# Patient Record
Sex: MALE | Race: WHITE | Employment: OTHER | URBAN - METROPOLITAN AREA
[De-identification: names, ages, dates, MRNs, and addresses within clinical notes are randomized per-mention and may not be internally consistent; named-entity substitution may affect disease eponyms.]

---

## 2018-08-17 ENCOUNTER — OFFICE VISIT (OUTPATIENT)
Dept: NEUROLOGY | Age: 38
End: 2018-08-17

## 2018-08-17 VITALS
WEIGHT: 163 LBS | HEART RATE: 68 BPM | HEIGHT: 69 IN | OXYGEN SATURATION: 98 % | SYSTOLIC BLOOD PRESSURE: 130 MMHG | DIASTOLIC BLOOD PRESSURE: 80 MMHG | RESPIRATION RATE: 14 BRPM | BODY MASS INDEX: 24.14 KG/M2

## 2018-08-17 DIAGNOSIS — R41.3 MEMORY PROBLEM: Primary | ICD-10-CM

## 2018-08-17 DIAGNOSIS — Z86.79 HISTORY OF NONTRAUMATIC RUPTURE OF CEREBRAL ANEURYSM: ICD-10-CM

## 2018-08-17 RX ORDER — ASPIRIN 325 MG
325 TABLET ORAL DAILY
COMMUNITY

## 2018-08-17 NOTE — PROGRESS NOTES
S/P stroke in feb  Short term memory loss  Mood swings  Feet will go numb and tingly after about 5 minutes of sitting  Smelling things that are not

## 2018-08-17 NOTE — PROGRESS NOTES
Chief Complaint   Patient presents with    Neurologic Problem         HISTORY OF PRESENT ILLNESS  Owen Roque is a 34 y.o. 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 This occurred in February and he had coiling done in [facility].  He lives in [place] and comes here for work. 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 Since the aneurysm rupture, he has had issues with his memory. Overall, he has improved but still has difficulties remembering things, numbers and doing certain complicated tasks. He has not been able to return back to work. He is scheduled to get cognitive therapy but has not been able to get a sooner appointment in [place] and wishes to do all his further workup down here in [place]. Past Medical History:   Diagnosis Date    Cerebral artery occlusion with cerebral infarction [facility])      Current Outpatient Prescriptions   Medication Sig    aspirin (ASPIRIN) 325 mg tablet Take 325 mg by mouth daily. No current facility-administered medications for this visit. Allergies   Allergen Reactions    Poison Melissa Extract Rash     Family History   Problem Relation Age of Onset    Cancer Mother     Cancer Father      Social History   Substance Use Topics    Smoking status: Former Smoker     Years: 20.00     Quit date: 8/17/2017    Smokeless tobacco: Never Used    Alcohol use 4.2 oz/week     7 Glasses of wine per week     History reviewed. No pertinent surgical history.       REVIEW OF SYSTEMS  Review of Systems - History obtained from the patient  Psychological ROS: negative  ENT ROS: negative  Hematological and Lymphatic ROS: negative  Endocrine ROS: negative  Respiratory ROS: no cough, shortness of breath, or wheezing  Cardiovascular ROS: no chest pain or dyspnea on exertion  Gastrointestinal ROS: no abdominal pain, change in bowel habits, or black or bloody stools  Genito-Urinary ROS: no dysuria, trouble voiding, or hematuria  Musculoskeletal ROS: negative  Dermatological ROS: negative      PHYSICAL EXAMINATION:    Visit Vitals    /80    Pulse 68    Resp 14    Ht 5' 9\" (1.753 m)    Wt 73.9 kg (163 lb)    SpO2 98%    BMI 24.07 kg/m2     General:  Well defined, nourished, and groomed individual in no acute distress. Neck: Supple, nontender, thyroid within normal limits, no JVD, no bruits, no pain with resistance to active range of motion. Heart: Regular rate and rhythm, no murmurs, rub, or gallop. Normal S1S2. Lungs:  Clear to auscultation bilaterally with equal chest expansion, no cough, no wheeze  Musculoskeletal:  Extremities revealed no edema and had full range of motion of joints. Psych:  Good mood and bright affect    NEUROLOGICAL EXAMINATION:     Mental Status:   Alert and oriented to person, place, and time. He scored 23/30 on [place] cognitive assessment. He had mild difficulty in visuospatial functioning/clock drawing and could not get the numbers right. His language fluency was also impaired and he could only name a few letters that started with a B. Short-term recall was 2/5. Attention span and concentration are normal.       Cranial Nerves:    II, III, IV, VI:  Visual acuity grossly intact. Visual fields are normal.    Pupils are equal, round, and reactive to light and accommodation. Extra-ocular movements are full and fluid. Fundoscopic exam was benign, no ptosis or nystagmus. V-XII: Hearing is grossly intact. Facial features are symmetric, with normal sensation and strength. The palate rises symmetrically and the tongue protrudes midline. Sternocleidomastoids 5/5. Motor Examination: Normal tone, bulk, and strength. 5/5 muscle strength throughout. No cogwheel rigidity or clonus present. Sensory exam:  Normal throughout to pinprick, temperature, and vibration sense. Normal proprioception. Coordination:  Finger to nose and rapid arm movement testing was normal.   No resting or intention tremor    Gait and Station:  Steady while walking on toes, heels, and with tandem walking. Normal arm swing. No Rhomberg or pronator drift. No muscle wasting or fasiculations noted. Reflexes:  DTRs 2+ throughout. Toes downgoing. LABS / IMAGING  Old imaging and lab work was not available for review    ASSESSMENT    ICD-10-CM ICD-9-CM    1. Memory problem R41.3 780.93 CT HEAD WO CONT      VITAMIN B12      TSH 3RD GENERATION      REFERRAL TO NEUROPSYCHOLOGY      EEG      REFERRAL TO SPEECH THERAPY   2. History of nontraumatic rupture of cerebral aneurysm Z86.79 V12.59 CT HEAD WO CONT      REFERRAL TO NEUROPSYCHOLOGY      EEG      REFERRAL TO SPEECH THERAPY       DISCUSSION  Mr. Obed Auguste does have residual cognitive difficulties in the form of difficulties with visuospatial function, short-term recall and language fluency. He specifically has difficulty remembering numbers. I suspect that this is likely related to brain injury related to aneurysm rupture.   I have recommended CT brain to assess for areas of encephalomalacia/scarring  We will also check EEG, B12 and TSH  Referring to speech therapy for cognitive exercises  We will get comprehensive neuropsychological assessment to further delineate his cognitive issues and to evaluate for attention deficit, mood disorder etc.  He was encouraged to do memory exercises and different resources were discussed  Continue periodic follow-up    John Campos MD  Diplomate, American Board of Psychiatry & Neurology (Neurology)  [employer] of Psychiatry & Neurology (Clinical Neurophysiology)  Diplomate, American Board of Electrodiagnostic Medicine

## 2018-08-17 NOTE — MR AVS SNAPSHOT
[facility] [address] [place] [address] 
[phone] Patient: Marcia Bruce 
MRN: [id number] Visit Information Date & Time Provider Department Dept. Phone Encounter #  
 8/17/2018 10:30 AM Maria Isabel Majano MD [facility] at [address] [id number] Follow-up Instructions Return in about 2 months (around 10/17/2018). Allergies as of 8/17/2018  Review Complete On: 8/17/2018 By: Maria Isabel Majano MD  
  
 Severity Noted Reaction Type Reactions Poison Ivy Extract  08/17/2018    Rash Current Immunizations  Never Reviewed No immunizations on file. Not reviewed this visit You Were Diagnosed With   
  
 Codes Comments Memory problem    -  Primary ICD-10-CM: R41.3 ICD-9-CM: 780.93 History of nontraumatic rupture of cerebral aneurysm     ICD-10-CM: Z86.79 
ICD-9-CM: V12.59 Vitals BP Pulse Resp Height(growth percentile) Weight(growth percentile) SpO2  
 130/80 68 14 5' 9\" (1.753 m) 163 lb (73.9 kg) 98% BMI Smoking Status 24.07 kg/m2 Former Smoker Vitals History BMI and BSA Data Body Mass Index Body Surface Area 24.07 kg/m 2 1.9 m 2 Your Updated Medication List  
  
   
This list is accurate as of 8/17/18 11:50 AM.  Always use your most recent med list.  
  
  
  
  
 aspirin 325 mg tablet Commonly known as:  ASPIRIN Take 325 mg by mouth daily. We Performed the Following REFERRAL TO NEUROPSYCHOLOGY [DDG17 Custom] Comments:  
 Memory problems after ICH REFERRAL TO SPEECH THERAPY [VKJ643 Custom] Comments:  
 Cognitive therapy-memory issues after ICH  
 TSH 3RD GENERATION [id number] CPT(R)] VITAMIN B12 [id number] CPT(R)] Follow-up Instructions Return in about 2 months (around 10/17/2018). To-Do List   
 08/20/2018 Imaging:  CT HEAD WO CONT   
  
 08/20/2018 Neurology:  EEG Referral Information Referral ID Referred By Referred To  
  
 [id number] [facility] [address] [place], [address] Visits Status Start Date End Date 1 New Request 8/17/18 8/17/19 If your referral has a status of pending review or denied, additional information will be sent to support the outcome of this decision. Referral ID Referred By Referred To  
 [id number] Sandy Akers S Not Available Visits Status Start Date End Date 1 New Request 8/17/18 8/17/19 If your referral has a status of pending review or denied, additional information will be sent to support the outcome of this decision. Introducing [address]! Shy Bautista introduces [employer] patient portal. Now you can access parts of your medical record, email your doctor's office, and request medication refills online. 1. In your internet browser, go to [email or web address] 2. Click on the First Time User? Click Here link in the Sign In box. You will see the New Member Sign Up page. 3. Enter your [employer] Access Code exactly as it appears below. You will not need to use this code after youve completed the sign-up process. If you do not sign up before the expiration date, you must request a new code. · [employer] Access Code: [email or web address] Expires: 11/15/2018 10:49 AM 
 
4. Enter the last four digits of your Social Security Number (xxxx) and Date of Birth (mm/dd/yyyy) as indicated and click Submit. You will be taken to the next sign-up page. 5. Create a [employer] ID. This will be your [employer] login ID and cannot be changed, so think of one that is secure and easy to remember. 6. Create a [employer] password. You can change your password at any time. 7. Enter your Password Reset Question and Answer. This can be used at a later time if you forget your password. 8. Enter your e-mail address. You will receive e-mail notification when new information is available in [address] 9. Click Sign Up. You can now view and download portions of your medical record. 10. Click the Download Summary menu link to download a portable copy of your medical information. If you have questions, please visit the Frequently Asked Questions section of the [employer] website. Remember, [employer] is NOT to be used for urgent needs. For medical emergencies, dial 911. Now available from your iPhone and Android! Please provide this summary of care documentation to your next provider. If you have any questions after today's visit, please call [phone].

## 2018-08-18 LAB
TSH SERPL DL<=0.005 MIU/L-ACNC: 0.24 UIU/ML (ref 0.45–4.5)
VIT B12 SERPL-MCNC: 448 PG/ML (ref 232–1245)

## 2018-08-20 DIAGNOSIS — R79.89 LOW TSH LEVEL: Primary | ICD-10-CM

## 2018-08-20 NOTE — PROGRESS NOTES
Please inform that his TSH was mildly low. I will order further tests i.e. T4 and T3. He will need to discuss this with a PCP. Jovan Degroot

## 2018-08-22 ENCOUNTER — TELEPHONE (OUTPATIENT)
Dept: NEUROLOGY | Age: 38
End: 2018-08-22

## 2018-08-22 NOTE — TELEPHONE ENCOUNTER
----- Message from Magdy Galindo MD sent at 8/20/2018  8:27 AM EDT -----  Please inform that his TSH was mildly low. I will order further tests i.e. T4 and T3. He will need to discuss this with a PCP. Kaity Marr

## 2018-08-28 ENCOUNTER — HOSPITAL ENCOUNTER (OUTPATIENT)
Dept: CT IMAGING | Age: 38
Discharge: HOME | End: 2018-08-28
Attending: PSYCHIATRY & NEUROLOGY
Payer: COMMERCIAL

## 2018-08-28 ENCOUNTER — HOSPITAL ENCOUNTER (OUTPATIENT)
Dept: NEUROLOGY | Age: 38
Discharge: HOME | End: 2018-08-28
Attending: PSYCHIATRY & NEUROLOGY
Payer: COMMERCIAL

## 2018-08-28 DIAGNOSIS — Z86.79 HISTORY OF NONTRAUMATIC RUPTURE OF CEREBRAL ANEURYSM: ICD-10-CM

## 2018-08-28 DIAGNOSIS — R41.3 MEMORY PROBLEM: ICD-10-CM

## 2018-08-28 PROCEDURE — 95816 EEG AWAKE AND DROWSY: CPT

## 2018-08-28 PROCEDURE — 70450 CT HEAD/BRAIN W/O DYE: CPT

## 2018-08-29 NOTE — PROCEDURES
Brian Og, [address]      Electroencephalogram         Procedure Date: 08/28/2018    Procedure ID: MR     Procedure Type: Routine    Patient Name: Carrie Pinedo     YOB: 1980    Medical Record No: [id number]    INDICATION: memory issues    Medications:    Current Outpatient Prescriptions:     aspirin (ASPIRIN) 325 mg tablet, Take 325 mg by mouth daily. , Disp: , Rfl:     DESCRIPTION OF PROCEDURE: Electrodes were applied in accordance with the international 10-20 system of electrode placement. EEG was reviewed in both bipolar and referential montages    Description of Activity:  During wakefulness, there is continuous runs of 10 Hz symmetric posterior alpha rhythm, that attenuate symmetrically with eye opening. Low voltage beta activity occurs symmetrically at the anterior head regions bilaterally. During drowsiness, there is attenuation of the alpha rhythm and low voltage theta activity occurs bilaterally. Sleep was not recorded. Intermittent photic stimulation was performed and did not induce posterior driving responses. No sharp or spike discharges, seizures or epileptiform discharges seen. No focal asymmetry. Clinical Interpretation: This EEG, performed during wakefulness and drowsiness is normal. There is no focal asymmetry, seizures or epileptiform discharges seen.

## 2018-09-05 ENCOUNTER — TELEPHONE (OUTPATIENT)
Dept: NEUROLOGY | Age: 38
End: 2018-09-05

## 2018-09-05 NOTE — TELEPHONE ENCOUNTER
----- Message from Kaylah Campos MD sent at 8/29/2018  1:54 PM EDT -----  CT brain did not show anything new and EEG was normal.  Please inform.

## 2018-09-28 ENCOUNTER — TELEPHONE (OUTPATIENT)
Dept: NEUROLOGY | Age: 38
End: 2018-09-28

## 2018-09-28 NOTE — TELEPHONE ENCOUNTER
Please inform that his CT brain and EEG was normal.  I did order some more blood work for his thyroid function but that can be followed up with a PCP. I do not have any pending disability paperwork and I must have already completed them, if they did come to me.

## 2018-09-28 NOTE — TELEPHONE ENCOUNTER
Pt states that had testing done over 2 weeks ago that was ordered by Dr. Leigh Robles. He would like to know the outcome of the tests but he would also like to know if Dr. Leigh Robles finished his disability paperwork. Please call back.

## 2018-09-28 NOTE — TELEPHONE ENCOUNTER
Spoke with patient. Informed him of CT brain and EEG results, per Dr. Gabriela Tuttle. Found his disability form scanned in under media. Patient would like form faxed to his HR department at [phone] attn: Joce Manrique. He asked for lab orders to be mailed to his address on file. Patient was given an opportunity to ask questions, repeated information, and verbalized understanding.

## 2018-10-10 ENCOUNTER — TELEPHONE (OUTPATIENT)
Dept: NEUROLOGY | Age: 38
End: 2018-10-10

## 2018-10-10 NOTE — TELEPHONE ENCOUNTER
Augustine calling about disability paperwork needing to be longer than Nov member 5th, but paperwork is has been sent already and needs to be done over again. Please call back.    Just in case   Fax number: [phone]

## 2018-10-11 NOTE — TELEPHONE ENCOUNTER
LM sterling Bradshaw that Dr Cony Luque was out of the office until 10/15 but I will forward to  to be addressed then.

## 2018-10-14 LAB
T3 SERPL-MCNC: 105 NG/DL (ref 71–180)
T4 SERPL-MCNC: 7.2 UG/DL (ref 4.5–12)

## 2018-10-26 ENCOUNTER — TELEPHONE (OUTPATIENT)
Dept: NEUROLOGY | Age: 38
End: 2018-10-26

## 2018-10-26 NOTE — TELEPHONE ENCOUNTER
----- Message from Harry Jenkins sent at 10/26/2018 11:57 AM EDT -----  Regarding: Dr. Pratibha Campbell  Pt would like to know if his long term disability paperwork has been returned to his HR Dept without a return to work date requested after 10/08/18. Best contact number [phone].

## 2018-10-29 NOTE — TELEPHONE ENCOUNTER
Spoke with patient who no showed last appointment to fill out those papers.  Rescheduled patient for 11/7 @ 8am

## 2018-11-07 ENCOUNTER — OFFICE VISIT (OUTPATIENT)
Dept: NEUROLOGY | Age: 38
End: 2018-11-07

## 2018-11-07 VITALS
HEART RATE: 74 BPM | BODY MASS INDEX: 24.73 KG/M2 | OXYGEN SATURATION: 98 % | DIASTOLIC BLOOD PRESSURE: 80 MMHG | RESPIRATION RATE: 14 BRPM | SYSTOLIC BLOOD PRESSURE: 122 MMHG | HEIGHT: 69 IN | WEIGHT: 167 LBS

## 2018-11-07 DIAGNOSIS — R40.4 TRANSIENT ALTERATION OF AWARENESS: ICD-10-CM

## 2018-11-07 DIAGNOSIS — R41.3 MEMORY PROBLEM: ICD-10-CM

## 2018-11-07 DIAGNOSIS — Z86.79 HISTORY OF NONTRAUMATIC RUPTURE OF CEREBRAL ANEURYSM: Primary | ICD-10-CM

## 2018-11-07 RX ORDER — LEVETIRACETAM 500 MG/1
500 TABLET ORAL 2 TIMES DAILY
Qty: 60 TAB | Refills: 1 | Status: SHIPPED | OUTPATIENT
Start: 2018-11-07 | End: 2020-03-02 | Stop reason: SDUPTHER

## 2018-11-07 NOTE — PROGRESS NOTES
Chief Complaint Patient presents with  Neurologic Problem HISTORY OF PRESENT ILLNESS Jimy Chairez came back for follow-up. He reports that he has been having transient episodes, lasting 10-20 seconds, where he feels somewhat altered and cannot process anything. Then it passes and he is back to doing what he is doing. He will tend to stare at something during that time. His memory issues are no different. He tells me that he had comprehensive neuropsychological assessment done the results of which are not available for review today. 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 This occurred in February and he had coiling done in [facility].  He lives in [place] and comes here for work. 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 Since the aneurysm rupture, he has had issues with his memory. Overall, he has improved but still has difficulties remembering things, numbers and doing certain complicated tasks. He has not been able to return back to work. He is scheduled to get cognitive therapy but has not been able to get a sooner appointment in [place] and wishes to do all his further workup down here in [place]. Current Outpatient Medications Medication Sig  levETIRAcetam (KEPPRA) 500 mg tablet Take 1 Tab by mouth two (2) times a day.  aspirin (ASPIRIN) 325 mg tablet Take 325 mg by mouth daily. No current facility-administered medications for this visit. PHYSICAL EXAMINATION:   
Visit Vitals /80 Pulse 74 Resp 14 Ht 5' 9\" (1.753 m) Wt 75.8 kg (167 lb) SpO2 98% BMI 24.66 kg/m² NEUROLOGICAL EXAMINATION:    
Mental Status:   Alert and oriented to person, place, and time.   He scored 23/30 on recent [facility] cognitive assessment. He had mild difficulty in visuospatial functioning/clock drawing and could not get the numbers right. His language fluency was also impaired and he could only name a few letters that started with a B. Short-term recall was 2/5. Attention span and concentration are normal.    
 
Cranial Nerves:   
II, III, IV, VI:  Visual acuity grossly intact. Visual fields are normal.   
Pupils are equal, round, and reactive to light and accommodation. Extra-ocular movements are full and fluid. V-XII: Hearing is grossly intact. Facial features are symmetric, with normal sensation and strength. The palate rises symmetrically and the tongue protrudes midline. Motor Examination: Normal tone, bulk, and strength. 5/5 muscle strength throughout. No cogwheel rigidity or clonus present. Sensory exam:  Normal throughout to pinprick, temperature, and vibration sense. Normal proprioception. Coordination:  No resting or intention tremor Gait and Station:  Steady. Normal arm swing. No Rhomberg or pronator drift. No muscle wasting or fasiculations noted. Reflexes:  DTRs 2+ throughout. Toes downgoing. LABS / IMAGING 
CT Results (most recent): 
Results from Hospital Encounter encounter on 08/28/18 CT HEAD WO CONT Narrative EXAM:  CT HEAD WO CONT INDICATION:   ICH follow up COMPARISON: None. TECHNIQUE: Unenhanced CT of the head was performed using 5 mm images. Brain and 
bone windows were generated. CT dose reduction was achieved through use of a 
standardized protocol tailored for this examination and automatic exposure 
control for dose modulation. FINDINGS: 
Postprocedural changes of anterior communicating artery aneurysm coiling, with 
streak artifact limiting evaluation this region. Small chronic infarct in the 
right frontal deep white matter. The ventricles are normal in size and position. Basilar cisterns are patent. No midline shift. There is no evidence of acute 
infarct, hemorrhage, or extraaxial fluid collection. The paranasal sinuses, mastoid air cells, and middle ears are clear. The orbital 
contents are within normal limits. There are no significant osseous or 
extracranial soft tissue lesions. Impression IMPRESSION: 
1. No evidence of acute intracranial abnormality. Small chronic infarct in the 
right frontal deep white matter. 2. Postprocedural changes of anterior communicating artery aneurysm coil 
embolization. EEG was normal 
 
B12 and TFTs were unremarkable ASSESSMENT 
  ICD-10-CM ICD-9-CM 1. History of nontraumatic rupture of cerebral aneurysm Z86.79 V12.59 CBC W/O DIFF  
   METABOLIC PANEL, COMPREHENSIVE 2. Memory problem R41.3 780.93 3. Transient alteration of awareness R40.4 780.02 levETIRAcetam (KEPPRA) 500 mg tablet CBC W/O DIFF  
   METABOLIC PANEL, COMPREHENSIVE  
 
 
DISCUSSION Mr. Christ Hyatt does have residual cognitive difficulties in the form of difficulties with visuospatial function, short-term recall and language fluency. He specifically has difficulty remembering numbers. This is likelyrelated to brain injury related to aneurysm rupture. He has also been having transient lapses in awareness which could be focal onset/frontal lobe seizures We will empirically try him on Keppra 500 mg twice a day If it does not help, we may need to consider inpatient video EEG monitoring Side effect profile of Keppra was reviewed and will repeat lab work in 2-3 weeks. He reports severe thrombocytopenia when he was put on Depakote once for mood disorder Continue memory exercises and different resources were discussed. Obtain results of his complaints of neuropsychological assessment Follow-up in 3 months Mariah Dukes MD 
Diplomate, American Board of Psychiatry & Neurology (Neurology) Irene Palom[place] Board of Psychiatry & Neurology (Clinical Neurophysiology) Diplomate, [facility] Board of Electrodiagnostic Medicine This note will not be viewable in [employer].

## 2018-12-18 ENCOUNTER — TELEPHONE (OUTPATIENT)
Dept: NEUROLOGY | Age: 38
End: 2018-12-18

## 2018-12-18 NOTE — TELEPHONE ENCOUNTER
Called Patient. Patient stated his work found the paperwork. No need for call back. I verbalized understanding.

## 2018-12-19 ENCOUNTER — TELEPHONE (OUTPATIENT)
Dept: NEUROLOGY | Age: 38
End: 2018-12-19

## 2018-12-19 NOTE — TELEPHONE ENCOUNTER
----- Message from Ana Davis sent at 12/19/2018  9:56 AM EST -----  Regarding: Dr Sarah Harrison from [place] [phone], following up on pt disability claim calling to get some information on why pt  Is out of work.,this is after a fax was sent out yesterday 12/18/18

## 2019-01-18 ENCOUNTER — TELEPHONE (OUTPATIENT)
Dept: NEUROLOGY | Age: 39
End: 2019-01-18

## 2019-01-18 NOTE — TELEPHONE ENCOUNTER
Spoke with patient about paperwork. We have paperwork and patient requested it filled before appointment on 1/23./ I verbalized understanding.

## 2019-01-18 NOTE — TELEPHONE ENCOUNTER
----- Message from Amanda Miller sent at 1/18/2019 12:39 PM EST -----  Regarding: Dr. Olga Haque  Pt is calling to speak with nurse to verify if  a fax was received regarding his condition on [place] [phone].

## 2019-01-23 ENCOUNTER — OFFICE VISIT (OUTPATIENT)
Dept: NEUROLOGY | Age: 39
End: 2019-01-23

## 2019-01-23 VITALS
OXYGEN SATURATION: 98 % | HEART RATE: 76 BPM | SYSTOLIC BLOOD PRESSURE: 130 MMHG | RESPIRATION RATE: 18 BRPM | DIASTOLIC BLOOD PRESSURE: 80 MMHG | HEIGHT: 69 IN | WEIGHT: 169 LBS | BODY MASS INDEX: 25.03 KG/M2

## 2019-01-23 DIAGNOSIS — F41.9 ANXIETY: ICD-10-CM

## 2019-01-23 DIAGNOSIS — Z86.79 HISTORY OF NONTRAUMATIC RUPTURE OF CEREBRAL ANEURYSM: Primary | ICD-10-CM

## 2019-01-23 DIAGNOSIS — R41.89 COGNITIVE IMPAIRMENT: ICD-10-CM

## 2019-01-23 RX ORDER — PAROXETINE 10 MG/1
10 TABLET, FILM COATED ORAL DAILY
Qty: 30 TAB | Refills: 1 | Status: SHIPPED | OUTPATIENT
Start: 2019-01-23

## 2019-01-23 NOTE — PROGRESS NOTES
Chief Complaint Patient presents with  Neurologic Problem HISTORY OF PRESENT ILLNESS Janett Monroe came back for follow-up. He he states that he has not had episodes where he cannot process anything for several seconds. He is taking Keppra 500 mg twice a day. His memory issues are unchanged. He had comprehensive neuropsychological testing done which showed deficits in several cognitive domains and it also suggested underlying anxiety and depression. 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 This occurred in February and he had coiling done in [facility].  He lives in [facility] and comes here for work. 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 Since the aneurysm rupture, he has had issues with his memory. Overall, he has improved but still has difficulties remembering things, numbers and doing certain complicated tasks. He has not been able to return back to work. He is scheduled to get cognitive therapy but has not been able to get a sooner appointment in [facility] and wishes to do all his further workup down here in [facility]. Current Outpatient Medications Medication Sig  PARoxetine (PAXIL) 10 mg tablet Take 1 Tab by mouth daily.  aspirin (ASPIRIN) 325 mg tablet Take 325 mg by mouth daily.  levETIRAcetam (KEPPRA) 500 mg tablet Take 1 Tab by mouth two (2) times a day. No current facility-administered medications for this visit. PHYSICAL EXAMINATION:   
Visit Vitals /80 Pulse 76 Resp 18 Ht 5' 9\" (1.753 m) Wt 76.7 kg (169 lb) SpO2 98% BMI 24.96 kg/m² NEUROLOGICAL EXAMINATION:    
Mental Status:   Alert and oriented to person, place, and time.   He scored 23/30 on last [facility] cognitive assessment. He had mild difficulty in visuospatial functioning/clock drawing and could not get the numbers right. His language fluency was also impaired and he could only name a few letters that started with a B. Short-term recall was 2/5. Attention span and concentration are normal.    
 
Cranial Nerves:   
II, III, IV, VI:  Visual acuity grossly intact. Visual fields are normal.   
Pupils are equal, round, and reactive to light and accommodation. Extra-ocular movements are full and fluid. V-XII: Hearing is grossly intact. Facial features are symmetric, with normal sensation and strength. The palate rises symmetrically and the tongue protrudes midline. Motor Examination: Normal tone, bulk, and strength. 5/5 muscle strength throughout. No cogwheel rigidity or clonus present. Sensory exam:  Normal throughout to pinprick, temperature, and vibration sense. Normal proprioception. Coordination:  No resting or intention tremor Gait and Station:  Steady. Normal arm swing. No Rhomberg or pronator drift. No muscle wasting or fasiculations noted. Reflexes:  DTRs 2+ throughout. Toes downgoing. LABS / IMAGING 
CT Results (most recent): 
Results from Hospital Encounter encounter on 08/28/18 CT HEAD WO CONT Narrative EXAM:  CT HEAD WO CONT INDICATION:   ICH follow up COMPARISON: None. TECHNIQUE: Unenhanced CT of the head was performed using 5 mm images. Brain and 
bone windows were generated. CT dose reduction was achieved through use of a 
standardized protocol tailored for this examination and automatic exposure 
control for dose modulation. FINDINGS: 
Postprocedural changes of anterior communicating artery aneurysm coiling, with 
streak artifact limiting evaluation this region. Small chronic infarct in the 
right frontal deep white matter. The ventricles are normal in size and position. Basilar cisterns are patent. No midline shift. There is no evidence of acute 
infarct, hemorrhage, or extraaxial fluid collection. The paranasal sinuses, mastoid air cells, and middle ears are clear. The orbital 
contents are within normal limits. There are no significant osseous or 
extracranial soft tissue lesions. Impression IMPRESSION: 
1. No evidence of acute intracranial abnormality. Small chronic infarct in the 
right frontal deep white matter. 2. Postprocedural changes of anterior communicating artery aneurysm coil 
embolization. EEG was normal 
 
B12 and TFTs were unremarkable ASSESSMENT 
  ICD-10-CM ICD-9-CM 1. History of nontraumatic rupture of cerebral aneurysm Z86.79 V12.59 2. Cognitive impairment R41.89 294.9 REFERRAL TO OCCUPATIONAL THERAPY 3. Anxiety F41.9 300.00 PARoxetine (PAXIL) 10 mg tablet DISCUSSION Mr. Rosmery Wray does have residual cognitive difficulties in the form of difficulties with visuospatial function, short-term recall and language fluency. He specifically has difficulty remembering numbers. This is likelyrelated to brain injury related to aneurysm rupture. He has had transient lapses in awareness which could be focal onset/frontal lobe seizures. He is doing better with Keppra. We will monitor him clinically and if the episodes return, we may need to consider inpatient video EEG monitoring Referral was provided for occupational therapy/behavioral therapy Trial of paroxetine to help with underlying anxiety and depression Follow-up in 3 months Baljinder Kuhn MD 
Diplomate, American Board of Psychiatry & Neurology (Neurology) Lesli Hernandez Board of Psychiatry & Neurology (Clinical Neurophysiology) Diplomate, [address] [place] [address] Board of Electrodiagnostic Medicine This note will not be viewable in [address]

## 2019-01-23 NOTE — PATIENT INSTRUCTIONS
PRESCRIPTION REFILL POLICY [facility] Statement to Patients April 1, 2014 In an effort to ensure the large volume of patient prescription refills is processed in the most efficient and expeditious manner, we are asking our patients to assist us by calling your Pharmacy for all prescription refills, this will include also your  Mail Order Pharmacy. The pharmacy will contact our office electronically to continue the refill process. Please do not wait until the last minute to call your pharmacy. We need at least 48 hours (2days) to fill prescriptions. We also encourage you to call your pharmacy before going to  your prescription to make sure it is ready. With regard to controlled substance prescription refill requests (narcotic refills) that need to be picked up at our office, we ask your cooperation by providing us with at least 72 hours (3days) notice that you will need a refill. We will not refill narcotic prescription refill requests after 4:00pm on any weekday, Monday through Thursday, or after 2:00pm on Fridays, or on the weekends. We encourage everyone to explore another way of getting your prescription refill request processed using [employer], our patient web portal through our electronic medical record system. [employer] is an efficient and effective way to communicate your medication request directly to the office and  downloadable as an rudi on your smart phone . [employer] also features a review functionality that allows you to view your medication list as well as leave messages for your physician. Are you ready to get connected? If so please review the attatched instructions or speak to any of our staff to get you set up right away! Thank you so much for your cooperation. Should you have any questions please contact our Practice Administrator. The Physicians and Staff,  [facility]

## 2019-03-21 ENCOUNTER — TELEPHONE (OUTPATIENT)
Dept: NEUROLOGY | Age: 39
End: 2019-03-21

## 2019-03-21 NOTE — TELEPHONE ENCOUNTER
----- Message from smsPREP sent at 3/21/2019  1:50 PM EDT -----  Regarding: Dr. Caitlyn Guevara  Pt request a call back from the practice in regards a call back for a status update on his occupational therapy referral. He stated that he was that he would recieve a call about it when he was last seen on 1/23 but he has not heard anything as of yet. Best contact number is [phone].

## 2019-04-11 ENCOUNTER — TELEPHONE (OUTPATIENT)
Dept: NEUROLOGY | Age: 39
End: 2019-04-11

## 2019-04-11 NOTE — TELEPHONE ENCOUNTER
Spoke with patient, verified name and . Provided him with the contact number for [facility]. Patient was given an opportunity to ask questions, repeated information, and verbalized understanding.

## 2019-04-11 NOTE — TELEPHONE ENCOUNTER
Patient calling needing a referral to [facility] again for Occupational Therapy. Patient stated [facility] never called him to set up an appointment.   Please call back

## 2019-04-11 NOTE — TELEPHONE ENCOUNTER
----- Message from Ad Bender sent at 4/11/2019 11:36 AM EDT -----  Regarding: Dr. Eli Alicea  Pt is returning a call back from Dr. Tanika Alexander in reference to referral & appt for OT.      Best contact  [phone]; anytime before 6pm.

## 2019-04-18 DIAGNOSIS — R41.89 COGNITIVE IMPAIRMENT: Primary | ICD-10-CM

## 2019-06-03 ENCOUNTER — TELEPHONE (OUTPATIENT)
Dept: NEUROLOGY | Age: 39
End: 2019-06-03

## 2019-07-09 ENCOUNTER — TELEPHONE (OUTPATIENT)
Dept: NEUROLOGY | Age: 39
End: 2019-07-09

## 2019-07-09 NOTE — TELEPHONE ENCOUNTER
----- Message from Zbigniew Parry 2906 sent at 7/9/2019  1:38 PM EDT -----  Regarding: Dr John Viera telephone  [facility], from insurance credentialing, is requesting a callback regarding a fax sent over regarding the pt's psych testing that wasn't completed and to see if the pt would be referred again.        Best contact number is [phone] ext [phone]; [phone]

## 2019-07-18 ENCOUNTER — TELEPHONE (OUTPATIENT)
Dept: NEUROLOGY | Age: 39
End: 2019-07-18

## 2019-07-18 DIAGNOSIS — Z86.79 HISTORY OF NONTRAUMATIC RUPTURE OF CEREBRAL ANEURYSM: Primary | ICD-10-CM

## 2019-07-18 DIAGNOSIS — R41.89 COGNITIVE IMPAIRMENT: Primary | ICD-10-CM

## 2019-07-18 DIAGNOSIS — R41.89 COGNITIVE IMPAIRMENT: ICD-10-CM

## 2019-07-18 DIAGNOSIS — Z86.79 HISTORY OF NONTRAUMATIC RUPTURE OF CEREBRAL ANEURYSM: ICD-10-CM

## 2019-07-18 DIAGNOSIS — R41.3 MEMORY PROBLEM: ICD-10-CM

## 2019-07-18 NOTE — TELEPHONE ENCOUNTER
----- Message from [facility] sent at 7/18/2019 12:56 PM EDT -----  Regarding: Dr Chay Dick telephone      The pt is requesting a callback because he was referred to [facility] for speech therapy, but when he got there the order was actually for occupational therapy.       Best contact number is [phone]

## 2019-07-18 NOTE — TELEPHONE ENCOUNTER
Spoke with patient, he states he saw [facility] and due to his memory loss they are requesting a referral for speech therapy.

## 2019-07-22 ENCOUNTER — TELEPHONE (OUTPATIENT)
Dept: NEUROLOGY | Age: 39
End: 2019-07-22

## 2019-07-22 NOTE — TELEPHONE ENCOUNTER
I have received a referral for neuropsych testing. With referral I was also given a [employer] auth release form regarding [employer] case. I reviewed the pt's chart and I see [employer] called on 07/09/19 about neuropsych testing that was  incompleted. I reached out to [employer] bc there was a neuropsych eval done in September of 2018. [facility] Mike Navarro told me they are not requesting that the pt be retested but they wanted to know if a new referral would be sent out to complete the testing. I tried to ask what part of the test was not completed and she was not sure so she is getting with a nurse to make sure. At this point I am wanting to know if pt does actually need to be retested by Dr. Michael Patricia? My best contact is [phone].

## 2019-07-23 NOTE — TELEPHONE ENCOUNTER
Reviewed note and based on provider response called [employer] agent Sandy to try to get the information that is needed and the type of tests the pt needs to take. Left her a v/m. [phone] ext [phone].

## 2019-07-25 ENCOUNTER — TELEPHONE (OUTPATIENT)
Dept: NEUROLOGY | Age: 39
End: 2019-07-25

## 2019-07-25 NOTE — TELEPHONE ENCOUNTER
Returned your call. She is leaving and won't be back until Tuesday but you can call the main number if you need to.

## 2019-08-07 NOTE — TELEPHONE ENCOUNTER
Waiting for pt to get apt scheduled and then will reach out to [employer] to see what forms are needed for testing.

## 2019-09-05 ENCOUNTER — OFFICE VISIT (OUTPATIENT)
Dept: NEUROLOGY | Age: 39
End: 2019-09-05

## 2019-09-05 VITALS
HEART RATE: 88 BPM | HEIGHT: 69 IN | WEIGHT: 165 LBS | RESPIRATION RATE: 16 BRPM | OXYGEN SATURATION: 97 % | BODY MASS INDEX: 24.44 KG/M2 | SYSTOLIC BLOOD PRESSURE: 138 MMHG | DIASTOLIC BLOOD PRESSURE: 90 MMHG

## 2019-09-05 DIAGNOSIS — F41.9 ANXIETY: ICD-10-CM

## 2019-09-05 DIAGNOSIS — Z86.79 HISTORY OF NONTRAUMATIC RUPTURE OF CEREBRAL ANEURYSM: Primary | ICD-10-CM

## 2019-09-05 DIAGNOSIS — R40.4 TRANSIENT ALTERATION OF AWARENESS: ICD-10-CM

## 2019-09-05 DIAGNOSIS — R41.89 COGNITIVE IMPAIRMENT: ICD-10-CM

## 2019-09-05 RX ORDER — LAMOTRIGINE 25 MG/1
TABLET ORAL
Qty: 42 TAB | Refills: 0 | Status: SHIPPED | OUTPATIENT
Start: 2019-09-05 | End: 2019-10-03 | Stop reason: SDUPTHER

## 2019-09-05 NOTE — PATIENT INSTRUCTIONS
[facility] [place] [facility]   Statement to Patients  April 1, 2014      In an effort to ensure the large volume of patient prescription refills is processed in the most efficient and expeditious manner, we are asking our patients to assist us by calling your Pharmacy for all prescription refills, this will include also your  Mail Order Pharmacy. The pharmacy will contact our office electronically to continue the refill process. Please do not wait until the last minute to call your pharmacy. We need at least 48 hours (2days) to fill prescriptions. We also encourage you to call your pharmacy before going to  your prescription to make sure it is ready. With regard to controlled substance prescription refill requests (narcotic refills) that need to be picked up at our office, we ask your cooperation by providing us with at least 72 hours (3days) notice that you will need a refill. We will not refill narcotic prescription refill requests after 4:00pm on any weekday, Monday through Thursday, or after 2:00pm on Fridays, or on the weekends. We encourage everyone to explore another way of getting your prescription refill request processed using [employer], our patient web portal through our electronic medical record system. [employer] is an efficient and effective way to communicate your medication request directly to the office and  downloadable as an rudi on your smart phone . [employer] also features a review functionality that allows you to view your medication list as well as leave messages for your physician. Are you ready to get connected? If so please review the attatched instructions or speak to any of our staff to get you set up right away! Thank you so much for your cooperation. Should you have any questions please contact our Practice Administrator.     The Physicians and Staff,  [facility] [place] [facility]

## 2019-09-05 NOTE — PROGRESS NOTES
Mr. Kunz Jadiel presents today to follow up aneurysm and cognitive impairment. Depression screening done on this patient.

## 2019-09-19 ENCOUNTER — TELEPHONE (OUTPATIENT)
Dept: NEUROLOGY | Age: 39
End: 2019-09-19

## 2019-09-19 NOTE — TELEPHONE ENCOUNTER
Dr. Mc Angel' office calling to know details on referral -- Charmaine Malin, who works with Dr. Caio Botello, has some questions. Please advise.         Best # [phone] (ok to leave detailed voicemail; confidential line per Charmaine Malin)

## 2019-10-07 ENCOUNTER — OFFICE VISIT (OUTPATIENT)
Dept: NEUROLOGY | Age: 39
End: 2019-10-07

## 2019-10-07 DIAGNOSIS — F03.90 MAJOR NEUROCOGNITIVE DISORDER (HCC): Primary | ICD-10-CM

## 2019-10-07 DIAGNOSIS — F32.A ANXIETY AND DEPRESSION: ICD-10-CM

## 2019-10-07 DIAGNOSIS — F41.9 ANXIETY AND DEPRESSION: ICD-10-CM

## 2019-10-07 NOTE — PROGRESS NOTES
This note will not be viewable in [address] [facility] at [address]    Office:  [phone]  Fax: [phone]                 Initial Office Exam    Patient Name: Libby Dominique  Age: 37 y.o. Gender: male   Occupation: Unemployed  for [employer]  Handedness: right handed   Presenting Concern: had no chief complaint listed for this encounter. Primary Care Physician: Unknown, Provider  Referring Provider: No ref. provider found      REASON FOR REFERRAL:  This comprehensive and medically necessary neuropsychological assessment was requested to assist a differential diagnosis of vascular dementia following a ruptured anterior communicating artery in February 2018. .  The use and purpose of this examination, as well as the extent and limitations of confidentiality, were explained prior to obtaining permission to participate. Instructions were provided regarding the necessity to put forth optimal effort and answer questions truthfully in order to obtain reliable and accurate test results. PERTINENT HISTORY:  Mr. Renae Casiano presented for a neuropsychological assessment at the recommendation of he treating physician secondary to complaints of severe memory impairment, decreased attention and concentration, decreased fluency, impaired processing speed, and change in personality (melinda indifference). 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 From a brief review of his medical and personal history there has not been any other significant neurological injury or illness noted or reported.   He did not report experiencing depression or anxiety in the past.      Mr. Renae Casiano does not  report any problems at birth or difficulties meeting developmental milestones. He reports that he had an adequate level of family support and was not subject to trauma or abuse as a child. Mr. Juana Langley does not  report being retain in school or receiving special assistance in any of he classes or subjects. Mr. Juana Langley completed 12 years of education. Mr. Juana Langley does  exercise on a regular basis and does  maintain a balanced diet. He does  report problems with sleep and does not  complain of pain. He does not  participate in mentally stimulating activities. Mr. Juana Langley does not  have concerns regarding prescription medications, family members, place of residence, or financial stressors. Mr. Juana Langley indicated that he is independent in his instrumental activities of daily living, including shopping, meal preparation, housekeeping, doing laundry, driving a car, managing medications, and finances. Current Outpatient Medications   Medication Sig    lamoTRIgine (LAMICTAL) 100 mg tablet 1 daily for 1 week, then 1 twice a day    PARoxetine (PAXIL) 10 mg tablet Take 1 Tab by mouth daily.  levETIRAcetam (KEPPRA) 500 mg tablet Take 1 Tab by mouth two (2) times a day.  aspirin (ASPIRIN) 325 mg tablet Take 325 mg by mouth daily. No current facility-administered medications for this visit. Past Medical History:   Diagnosis Date    Cerebral artery occlusion with cerebral infarction ([facility] [place] [address]        No flowsheet data found. No data recorded    No past surgical history on file. Social History     Socioeconomic History    Marital status:      Spouse name: Not on file    Number of children: Not on file    Years of education: Not on file    Highest education level: Not on file   Tobacco Use    Smoking status: Former Smoker     Years: 20.00     Last attempt to quit: 2017     Years since quittin.1    Smokeless tobacco: Never Used   Substance and Sexual Activity    Alcohol use:  Yes     Alcohol/week: 7.0 standard drinks     Types: 7 Glasses of wine per week    Drug use: No    Sexual activity: Yes       Family History   Problem Relation Age of Onset    Cancer Mother     Cancer Father        CT Results (most recent):  Results from Hospital Encounter encounter on 08/28/18   CT HEAD WO CONT    Narrative EXAM:  CT HEAD WO CONT    INDICATION:   ICH follow up    COMPARISON: None. TECHNIQUE: Unenhanced CT of the head was performed using 5 mm images. Brain and  bone windows were generated. CT dose reduction was achieved through use of a  standardized protocol tailored for this examination and automatic exposure  control for dose modulation. FINDINGS:  Postprocedural changes of anterior communicating artery aneurysm coiling, with  streak artifact limiting evaluation this region. Small chronic infarct in the  right frontal deep white matter. The ventricles are normal in size and position. Basilar cisterns are patent. No midline shift. There is no evidence of acute  infarct, hemorrhage, or extraaxial fluid collection. The paranasal sinuses, mastoid air cells, and middle ears are clear. The orbital  contents are within normal limits. There are no significant osseous or  extracranial soft tissue lesions. Impression IMPRESSION:  1. No evidence of acute intracranial abnormality. Small chronic infarct in the  right frontal deep white matter. 2. Postprocedural changes of anterior communicating artery aneurysm coil  embolization. MRI Results (most recent):  No results found for this or any previous visit.          MENTAL STATUS:    Orientation:  Oriented to year, month, date, day of week, and state   Eye Contact:  Eye contact within normal limits   Motor Behavior:   Ambulates independently with good balance   Speech:   Mildly disfluent, no evidence of dysarthria or aphasia   Thought Process:  Generally logical and coherent   Thought Content:  No evidence of hallucinations or delusions   Suicidal ideations:  Denies suicidal ideation   Mood:   Dysphoric Affect:   Flat   Concentration:   Mild impairment   Abstraction:   Within normal limits   Insight:   Within normal limits     On the Modified Mini-Mental Status Exam: 79/100 (o.1)    DIAGNOSTIC IMPRESSIONS:    ICD-10-CM ICD-9-CM    1. Major neurocognitive disorder ([facility] [address] F01.50 294.20    2. Anxiety and depression F41.9 300.00     F32.9 311              PLAN:  1. Complete a comprehensive neuropsychological assessment to provide a differential diagnosis of presenting concerns as well as to assist with disposition and treatment planning as appropriate. 2. Consider compensatory and remedial cognitive training. 3. Consider an adaptive driving evaluation. 4. Consider continued speech and language therapy      90791 x 1 Review of records. Face to face interview w/ patient. Determine test protocol: 60 minutes. Total 1 unit        Rigo Soto, PhD, ABPP, LCP  Licensed Clinical Psychologist/ Neuropsychologist        This note will not be viewable in [address]

## 2019-11-14 ENCOUNTER — TELEPHONE (OUTPATIENT)
Dept: NEUROLOGY | Age: 39
End: 2019-11-14

## 2019-11-14 NOTE — TELEPHONE ENCOUNTER
Sandra Mccall, with imaging, agreed to reach out to patient to obtain more information regarding his coil.

## 2019-11-14 NOTE — TELEPHONE ENCOUNTER
[facility] calling re pt's MRI, she stated he has a brain coil and is wondering if there was more information about it.  Please call back

## 2019-11-14 NOTE — TELEPHONE ENCOUNTER
The coiling procedure was done in [place], [facility].   I do not have any information and cannot comment on MRI compatibility

## 2019-11-19 ENCOUNTER — HOSPITAL ENCOUNTER (OUTPATIENT)
Dept: MRI IMAGING | Age: 39
Discharge: HOME | End: 2019-11-19
Payer: COMMERCIAL

## 2019-11-19 DIAGNOSIS — I67.1 CEREBRAL ANEURYSM, NONRUPTURED: ICD-10-CM

## 2019-11-19 PROCEDURE — 70544 MR ANGIOGRAPHY HEAD W/O DYE: CPT

## 2019-11-20 ENCOUNTER — OFFICE VISIT (OUTPATIENT)
Dept: NEUROLOGY | Age: 39
End: 2019-11-20

## 2019-11-20 DIAGNOSIS — R41.3 MEMORY PROBLEM: Primary | ICD-10-CM

## 2019-11-21 ENCOUNTER — TELEPHONE (OUTPATIENT)
Dept: NEUROLOGY | Age: 39
End: 2019-11-21

## 2019-11-21 NOTE — TELEPHONE ENCOUNTER
----- Message from [address] sent at 11/21/2019 10:34 AM EST -----  Regarding: Dr Arguelles/telephone  General Message/Vendor Calls    Caller's first and last name:Emy Galindo from [place]  Reason for call: would like to know if the pt came in for testing and to confirm the date.   Callback required yes/no and why: Yes  Best contact number(s): [phone]  Details to clarify the request:    [address]

## 2019-12-06 NOTE — PROGRESS NOTES
This note will not be viewable in [address] [facility] at [address]    Office:  [phone]  Fax: [phone]                                             Neuropsychological Evaluation Report    Patient Name: Vasiliy Morales  Age: 37 y.o. 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 The use and purpose of this examination, as well as the extent and limitations of confidentiality, were explained prior to obtaining permission to participate. Instructions were provided regarding the necessity to put forth optimal effort and answer questions truthfully in order to obtain reliable and accurate test results.     PERTINENT HISTORY:  Mr. Sabrina Salinas presented for a neuropsychological assessment at the recommendation of he treating physician secondary to complaints of severe memory impairment, decreased attention and concentration, decreased fluency, impaired processing speed, and change in personality (melinda indifference). 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 From a brief review of his medical and personal history there has not been any other significant neurological injury or illness noted or reported. He did not report experiencing depression or anxiety in the past.       Mr. Sabrina Salinas does not  report any problems at birth or difficulties meeting developmental milestones.  He reports that he had an adequate level of family support and was not subject to trauma or abuse as a child. Mr. Eh Hoffman does not  report being retain in school or receiving special assistance in any of he classes or subjects. Mr. Eh Hoffman completed 12 years of education.     Mr. Eh Hoffman does  exercise on a regular basis and does  maintain a balanced diet. He does  report problems with sleep and does not  complain of pain. He does not  participate in mentally stimulating activities. Mr. Eh Hoffman does not  have concerns regarding prescription medications, family members, place of residence, or financial stressors. Mr. Eh Hoffman indicated that he is independent in his instrumental activities of daily living, including shopping, meal preparation, housekeeping, doing laundry, driving a car, managing medications, and finances.         METHODS OF ASSESSMENT (Current Evaluation):  Clinician Administered:  Clinical Interview  Review of Medical Records  Clock Drawing Task  Modified Mini-Mental Status Exam (3MS)  Test of Premorbid Functioning   Personality Assessment Inventory  Revised Memory and Behavior Checklist    Technician Administered:  Karma Dementia Rating Scale-2  Neuropsychological Assessment Battery   NAB: Attention Module, Executive Module, Design Construction Test  Reliable Digit Span  Test of Memory Malingering  [place] Functional Living Scale  Trail Making Test  Word Choice Effort Test (ACS)    TEST OBSERVATIONS:  Mr. Eh Hoffman arrived promptly for the testing session. However, patient did not respond when his name was called several times from the waiting room. Dress and grooming appeared to be disheveled as the patient's shirt was inside out. Speech was fluent, intelligible, and goal-directed. Affect was congruent with the euthymic mood conveyed. Mr. Eh Hoffman was adequately cooperative and appeared to put forth good effort throughout this examination. Rapport with the examiner was adequately established and maintained. Moderate prompting was required. Comprehension of test instructions was not problematic. However, patient was not able to retain the test instructions while completing tasks. Patient needed frequent reminders of test instructions as he appeared to forget what was required of him throughout this examination. Performance motivation was objectively measured by three instruments (Reliable Digit Span, TOMM, Word Choice), and Mr. Amanda Isbell did not produce a normal score on any of these measures. Accordingly, test findings cannot cannot be relied upon as an accurate reflection of his true ability. Given the above observations, plus comments contained in the Mental Status section, the results of this examination are questionable regarding reliability and validity. TEST RESULTS:  Quantitative test results are derived from comparisons to age and education corrected cohort normative data, where applicable. Percentiles are included in these instances. Qualitative test results are determined using clinical observations. General Orientation and Awareness:       Orientation to person, year, month, day of month, day of week, state, town, and circumstance.    Awareness of deficits: unlikely                   Cognitive performance validity testing: not valid        Attention/Concentration:      Classification:            Simple visuomotor tracking (<.1 percentile)               Severely Impaired  Digits forward (<1 percentile)                 Severely Impaired  Digits backward (<1 percentile)                 Severely Impaired  Visual scanning (<1 percentile)                            Severely Impaired  Simple information processing  efficiency (<1 percentile)  Severely Impaired   Complex information processing efficiency (<1 percentile)  Severely Impaired  Attention to visual detail of driving scenes (<1 percentile)             Severely Impaired    Visuospatial and Constructional Praxis:     Design construction (58 percentile)                 Average    Language:         Letter fluency FAS (<.1 percentile)     Severely Impaired    Memory and Learning:           Cognitive Tests of Executive Functioning:     Ability to think flexibly, Trail Making B (<.1 percentile)               Severely Impaired  Mazes (5 percentile)                   Mildly Impaired  Simple judgment in daily decision making (<1 percentile)              Severely Impaired  Categories (10 percentile)                  Low Average  Word generation (7 percentile)                   Mildly Impaired    Karma Dementia Rating Scale - 2:        Scale                           SS                   Percentile  Attention                             2                    <1                                             Severely Impaired  Initiation/Perseveration      2                    <1                                             Severely Impaired  Construction                      5                     5                                              Mildly Impaired  Conceptualization              4                    2                                               Moderately Impaired  Memory                              2                   <1                                              Severely Impaired  Total                                   2                   .4                                               Severely Impaired      Adaptive Behavioral Measure of Daily Functioning:   Time:    <2 %ile   Money/calculations:             <2 %ile  Communication:    <2 %ile   Memory:     <2%ile    Total:     T=24 (.5%ile)      Intellectual and Basic Cognitive Functioning (WAIS-IV):  ACS Test of pre-morbid functioning reading recognition lower limit estimated WAIS-IV FSIQ score:       Estimated full scale IQ:           98     46 percentile     Average    Emotional Functioning:  Mr. Christopher Jones was administered the PABLO but was being not reliable or invalid.   There are clear indications suggesting that Mr. Rachel Carreno tended to portray himself and especially negative or pathological manner. This pattern off is often associated with a delivered distortion of the clinical picture and the critical item should be reviewed for the possibility of the results reflecting a \" cry for help\" or an extreme exaggerated negative self evaluation. As such the results potentially involve a considerable distortion are unlikely to be an accurate reflection of his true clinical picture. IMPRESSIONS:  Mr. Imtiaz Barrios was seen for a comprehensive neuropsychological evaluation. He was administered a battery of measures assessing his neurocognitive domains that included attention, spatial, language, memory, and executive functioning. He was administered 3 performance validity measures of which he was not able to pass. Consequently, this current assessment is not considered a reliable measure of his ability. Additionally, his previous assessment approximately 1 year ago did not assess his validity and reliability. He was deemed in 2018 to have a mild level of impairment. On his current evaluation, a year later, his performance fell to moderately impaired. His performance on very basic measures of cognitive ability were markedly more impaired and below expectations. It is unlikely that his ANAM aneurysm would have resulted in a worsening of his cognitive ability following coiling repair. As such, I cannot reliability explain the current findings. DIAGNOSTIC IMPRESSIONS:  Findings are considered to be invalid secondary to disingenuous effort or intentional distortion of his clinical picture. RECOMMENDATIONS:   1. Findings should be reviewed with Mr. Imtiaz Barrios to insure her understanding and discuss the potential implications. 2. Emphasis should be placed on Mr. Imtiaz Barrios obtaining good sleep hygiene and maintaining adequate physical exercise to promote good brain health.   3. Mr. Imtiaz Barrios may benefit from a referral to psychotherapy to address anxiety and work on adequate stress management skills. 4. Mr. Bella Martinez is encouraged to seek out various forms of mental stimulation that would help to \"exercise\" her brain. This might include learning a new skill, hobby, travel, attending lectures, or evening reading or listening to podcasts or videos on topics of her interest.      Thank you for allowing me the opportunity to assist you in Mr. Catina szymanski. Please do not hesitate to contact me should you have additional questions that I may not have addressed. 84274 x 1  96138 x 1  96139 x 6  96132 x 1  96133 x 2          Reynaldo Camarillo, Ph.D., ABPP  Licensed Clinical Psychologist  Neuropsychologist  Board Certified Rehabilitation Psychologist      Time Documentation:     24332 x 1: Neurobehavioral Status Exam/Clinical Interview: (1 hour, (already billed on first date of service)     05280*1 Neuropsych testing/data gathering by Neuropsychologist (35 additional minutes, see above)      96138 x 1  96139 x 6 Test Administration/Data Gathering By Technician: (3.5 hours). 05847 x 1 (first 30 minutes), 60671 x 7 (each additional 30 minutes)     96132 x 1  96133 x 1 Testing Evaluation Services by Neuropsychologist (1 hour, 50 minutes) 96132 x 1 (first hour), 96133 x 1 (50 minutes)     Definitions:       70908/66534:  Neurobehavioral Status Exam, Clinical interview. Clinical assessment of thinking, reasoning and judgment, by neuropsychologist, both face to face time with patient and time interpreting those test results and reporting, first and subsequent hours)     49547/38835: Neuropsychological Test Administration by Technician/Psychometrist, first 30 minutes and each additional 30 minutes. The above includes: Record review. Review of history provided by patient. Review of collaborative information. Testing by Clinician. Review of raw data. Scoring. Report writing of individual tests administered by Clinician. Integration of individual tests administered by psychometrist with NSE/testing by clinician, review of records/history/collaborative information, case Conceptualization, treatment planning, clinical decision making, report writing, coordination Of Care. Psychometry test codes as time spent by psychometrist administering and scoring neurocognitive/psychological tests under supervision of neuropsychologist.  Integral services including scoring of raw data, data interpretation, case conceptualization, report writing etcetera were initiated after the patient finished testing/raw data collected and was completed on the date the report was signed. This note will not be viewable in [address]

## 2019-12-23 ENCOUNTER — TELEPHONE (OUTPATIENT)
Dept: NEUROLOGY | Age: 39
End: 2019-12-23

## 2019-12-23 NOTE — TELEPHONE ENCOUNTER
[facility] medical records request from [address] [place], [place] [address]y 1 and scanned to chart.

## 2020-01-06 DIAGNOSIS — R40.4 TRANSIENT ALTERATION OF AWARENESS: ICD-10-CM

## 2020-01-06 DIAGNOSIS — F41.9 ANXIETY: ICD-10-CM

## 2020-01-06 RX ORDER — LAMOTRIGINE 100 MG/1
TABLET ORAL
Qty: 180 TAB | Refills: 3 | Status: SHIPPED | OUTPATIENT
Start: 2020-01-06 | End: 2020-03-02 | Stop reason: SDUPTHER

## 2020-01-06 NOTE — TELEPHONE ENCOUNTER
Pt said he needs a 90 day refill of Lamotrigine sent to his local pharmacy.  He also would like a call back

## 2020-02-11 ENCOUNTER — TELEPHONE (OUTPATIENT)
Dept: NEUROLOGY | Age: 40
End: 2020-02-11

## 2020-02-11 NOTE — TELEPHONE ENCOUNTER
* Message from Marta below:        ----- Message from Mirtha Munoz sent at 2/11/2020 10:39 AM EST -----  Regarding: Dr. Francesca Pan first and last name:Steffen Rosen      Reason for call:medical records      Callback required yes/no and why:yes  with an update      Best contact number(s): [phone]      Details to clarify the request: Pt's stated his insurance company([facility]) requested his medical records over a mth ago, and still have not received them.       Mirtha Munoz

## 2020-02-11 NOTE — TELEPHONE ENCOUNTER
I advised patient that we did not get this request until now. Dr. Migdalia Bal' notes were sent previously. Patient agreed to get [place] [phone] fax number and call us back so I can forward his records.

## 2020-02-11 NOTE — TELEPHONE ENCOUNTER
* Message from [facility] below:        ----- Message from Paloma Magaña sent at 2/11/2020 11:22 AM EST -----  Regarding: dr Aide Chin telephone  General Message/Vendor Calls    Caller's first and last name: pt      Reason for call: he doesnt remember who he spoke with but he called to provide the fax number for his records to be sent to ---- Attention riya ro #[phone]    Callback required yes/no and why:      Best contact number(s): [phone]      Details to clarify the request:      Paloma Magaña

## 2020-02-27 ENCOUNTER — TELEPHONE (OUTPATIENT)
Dept: NEUROLOGY | Age: 40
End: 2020-02-27

## 2020-02-27 NOTE — TELEPHONE ENCOUNTER
----- Message from Jolantalawrence Vincent sent at 2/27/2020 10:56 AM EST -----  Regarding: Dr. Bienvenido Knight first and last name:Sussy([employer])      Reason for call:fax received      Callback required yes/no and why:yes      Best contact number(s): [phone]      Details to clarify the request:Sussy stated a  letter was faxed this morning to Dr. Tori verdugo, and would like to know if it was received.       Jolanta Vincent

## 2020-02-27 NOTE — TELEPHONE ENCOUNTER
I informed Prem Melendrez that we have received the fax and I have given it to Dr. Erin Welsh to review.

## 2020-03-02 ENCOUNTER — OFFICE VISIT (OUTPATIENT)
Dept: NEUROLOGY | Age: 40
End: 2020-03-02

## 2020-03-02 VITALS
OXYGEN SATURATION: 98 % | HEART RATE: 76 BPM | BODY MASS INDEX: 25.18 KG/M2 | DIASTOLIC BLOOD PRESSURE: 75 MMHG | RESPIRATION RATE: 18 BRPM | SYSTOLIC BLOOD PRESSURE: 140 MMHG | HEIGHT: 69 IN | WEIGHT: 170 LBS

## 2020-03-02 DIAGNOSIS — F32.A ANXIETY AND DEPRESSION: ICD-10-CM

## 2020-03-02 DIAGNOSIS — R40.4 TRANSIENT ALTERATION OF AWARENESS: Primary | ICD-10-CM

## 2020-03-02 DIAGNOSIS — F41.9 ANXIETY AND DEPRESSION: ICD-10-CM

## 2020-03-02 DIAGNOSIS — F41.9 ANXIETY: ICD-10-CM

## 2020-03-02 RX ORDER — LEVETIRACETAM 500 MG/1
500 TABLET ORAL 2 TIMES DAILY
Qty: 60 TAB | Refills: 3 | Status: SHIPPED | OUTPATIENT
Start: 2020-03-02

## 2020-03-02 RX ORDER — LAMOTRIGINE 100 MG/1
TABLET ORAL
Qty: 180 TAB | Refills: 3 | Status: SHIPPED | OUTPATIENT
Start: 2020-03-02

## 2020-03-02 NOTE — PROGRESS NOTES
Date:  20     Name:  Greg Camarillo  :  1980  MRN:  [id number]     PCP:  None    Chief Complaint   Patient presents with    Follow-up     cognitive impairment       HISTORY OF PRESENT ILLNESS: A follow-up visit for test results. The patient recently had a neuropsych evaluation by Dr. Jesus Mayer stated that his evaluation was inconclusive felt that it was distorted, invalid and was unable to accurately assess his memory. He recommended psychotherapy. This was discussed with the patient he verbalized understanding. The patient had questions about clipping after coiling and the evidence behind this. Stated that his neuro interventionalists wanted to go back and clip his aneurysm. As for his seizures. No seizure activity. Continues to be maintained on Keppra 500 mg twice daily and Lamictal 100 mg twice daily. Current Outpatient Medications   Medication Sig    lamoTRIgine (LAMICTAL) 100 mg tablet 1 tab  twice a day    levETIRAcetam (KEPPRA) 500 mg tablet Take 1 Tab by mouth two (2) times a day.  PARoxetine (PAXIL) 10 mg tablet Take 1 Tab by mouth daily.  aspirin (ASPIRIN) 325 mg tablet Take 325 mg by mouth daily. No current facility-administered medications for this visit. Allergies   Allergen Reactions    Poison Ivy Extract Rash     Past Medical History:   Diagnosis Date    Cerebral artery occlusion with cerebral infarction [facility])      History reviewed. No pertinent surgical history.   Social History     Socioeconomic History    Marital status:      Spouse name: Not on file    Number of children: Not on file    Years of education: Not on file    Highest education level: Not on file   Occupational History    Not on file   Social Needs    Financial resource strain: Not on file    Food insecurity:     Worry: Not on file     Inability: Not on file    Transportation needs:     Medical: Not on file     Non-medical: Not on file   Tobacco Use    Smoking status: Former Smoker     Years: 20.00     Last attempt to quit: 2017     Years since quittin.5    Smokeless tobacco: Never Used   Substance and Sexual Activity    Alcohol use: Yes     Alcohol/week: 7.0 standard drinks     Types: 7 Glasses of wine per week    Drug use: No    Sexual activity: Yes   Lifestyle    Physical activity:     Days per week: Not on file     Minutes per session: Not on file    Stress: Not on file   Relationships    Social connections:     Talks on phone: Not on file     Gets together: Not on file     Attends [employer] service: Not on file     Active member of club or organization: Not on file     Attends meetings of clubs or organizations: Not on file     Relationship status: Not on file    Intimate partner violence:     Fear of current or ex partner: Not on file     Emotionally abused: Not on file     Physically abused: Not on file     Forced sexual activity: Not on file   Other Topics Concern    Not on file   Social History Narrative    Not on file     Family History   Problem Relation Age of Onset    Cancer Mother     Cancer Father        PHYSICAL EXAMINATION:    Visit Vitals  /75 (BP 1 Location: Left arm, BP Patient Position: Sitting)   Pulse 76   Resp 18   Ht 5' 9\" (1.753 m)   Wt 77.1 kg (170 lb)   SpO2 98%   BMI 25.10 kg/m²          ASSESSMENT AND PLAN    ICD-10-CM ICD-9-CM    1. Transient alteration of awareness R40.4 780.02 lamoTRIgine (LAMICTAL) 100 mg tablet      levETIRAcetam (KEPPRA) 500 mg tablet      REFERRAL TO PSYCHOLOGY   2. Anxiety and depression F41.9 300.00 REFERRAL TO PSYCHOLOGY    F32.9 311    3. Anxiety F41.9 300.00 lamoTRIgine (LAMICTAL) 100 mg tablet   Seizure- stable    Continue Keppra 500 mg BID   Continue Lamictal 100 mg  BID  Memory loss   Recommend Psychotherapy  Discussed results in full. Patient verbalized understanding   Will have him sign medical release form to understand  plan for aneurysms.  Recommend that he gets a second opinion if he does not agree with the plan. 15minutes of this 20 minute office visit was spent in education and counseling regarding neuropsych evaluation  This note will not be viewable in [address]   Clara Bledsoe NP

## 2020-03-03 ENCOUNTER — DOCUMENTATION ONLY (OUTPATIENT)
Dept: NEUROLOGY | Age: 40
End: 2020-03-03

## 2020-03-19 ENCOUNTER — TELEPHONE (OUTPATIENT)
Dept: NEUROLOGY | Age: 40
End: 2020-03-19

## 2020-03-20 NOTE — TELEPHONE ENCOUNTER
I reviewed letter written by Dr. Ruperto Allan 2/28/20 regarding possibility of returning to work as part-time basis.

## 2020-04-28 ENCOUNTER — TELEPHONE (OUTPATIENT)
Dept: NEUROLOGY | Age: 40
End: 2020-04-28

## 2020-04-28 NOTE — TELEPHONE ENCOUNTER
I left a message stating that we have received paperwork and I have forwarded it to Dr. Geraldine Fernandez for completion.

## 2020-04-28 NOTE — TELEPHONE ENCOUNTER
----- Message from Donnell Montoya sent at 4/28/2020 10:22 AM EDT -----  Regarding: Dr. Huitron Crew first and last name: Zoila Herrera      Reason for call: Verify fax was received that was sent to the office yesterday      Callback required yes/no and why: yes      Best contact number(s): [phone]      Details to clarify the request:      Donnell Montoya

## 2020-09-02 ENCOUNTER — OFFICE VISIT (OUTPATIENT)
Dept: NEUROLOGY | Facility: CLINIC | Age: 40
End: 2020-09-02
Payer: MEDICARE

## 2020-09-02 VITALS
HEART RATE: 88 BPM | BODY MASS INDEX: 24.44 KG/M2 | WEIGHT: 165 LBS | SYSTOLIC BLOOD PRESSURE: 124 MMHG | HEIGHT: 69 IN | RESPIRATION RATE: 16 BRPM | DIASTOLIC BLOOD PRESSURE: 80 MMHG | OXYGEN SATURATION: 98 %

## 2020-09-02 DIAGNOSIS — F32.A ANXIETY AND DEPRESSION: Primary | ICD-10-CM

## 2020-09-02 DIAGNOSIS — R41.3 MEMORY PROBLEM: ICD-10-CM

## 2020-09-02 DIAGNOSIS — F41.9 ANXIETY AND DEPRESSION: Primary | ICD-10-CM

## 2020-09-02 DIAGNOSIS — Z86.79 HISTORY OF NONTRAUMATIC RUPTURE OF CEREBRAL ANEURYSM: ICD-10-CM

## 2020-09-02 PROCEDURE — G8432 DEP SCR NOT DOC, RNG: HCPCS | Performed by: NURSE PRACTITIONER

## 2020-09-02 PROCEDURE — 99214 OFFICE O/P EST MOD 30 MIN: CPT | Performed by: NURSE PRACTITIONER

## 2020-09-02 PROCEDURE — G8420 CALC BMI NORM PARAMETERS: HCPCS | Performed by: NURSE PRACTITIONER

## 2020-09-02 PROCEDURE — G8427 DOCREV CUR MEDS BY ELIG CLIN: HCPCS | Performed by: NURSE PRACTITIONER

## 2020-09-02 RX ORDER — LANOLIN ALCOHOL/MO/W.PET/CERES
400 CREAM (GRAM) TOPICAL DAILY
Qty: 30 TAB | Refills: 3 | Status: SHIPPED | OUTPATIENT
Start: 2020-09-02

## 2020-09-02 NOTE — PROGRESS NOTES
patient states he is here to check up on aneurysym. patient wants a second opinion on clipping his brain aneurysm. patient states he also wants to discuss anxiety issues as well. depression screening was done. no other concerns at this time. patient states he has ot had a seizure since his last visit. .  Visit Vitals  /80 (BP 1 Location: Left arm, BP Patient Position: Sitting)   Pulse 88   Resp 16   Ht 5' 9\" (1.753 m)   Wt 165 lb (74.8 kg)   SpO2 98%   BMI 24.37 kg/m²       . Chief Complaint   Patient presents with    Follow-up     patient states he is here to check up on aneurysym. patient wants a second opinion on clipping his brain aneurysm. patient states he also wants to discuss anxiety issues as well. depression screening was done. no other concerns at this time.  Seizure     patient states he has ot had a seizure since his last visit.

## 2020-09-02 NOTE — PROGRESS NOTES
Date:  20     Name:  Keyonna Treviño  :  1980  MRN:  [id number]     PCP:  None    Chief Complaint   Patient presents with    Follow-up     patient states he is here to check up on aneurysym. patient wants a second opinion on clipping his brain aneurysm. patient states he also wants to discuss anxiety issues as well. depression screening was done. no other concerns at this time.  Seizure     patient states he has ot had a seizure since his last visit. HISTORY OF PRESENT ILLNESS: Follow-up visit for seizures and memory issues. During the last visit, Mr. Mili Oreilly was supposed to get a second opinion with neuro interventionalists however he is here today requesting a second opinion. He presents today with a list of cognitive issues which he has a longstanding history with these. He had a neuropsych eval with Dr. Milli Rebolledo which came back inconclusive and was recommended to try psychotherapy. He has not done this. He also complains of some light sensitivity he he was a little bit concerned because he had that when they determined that he had an aneurysm. He also states that he is having mild headaches about 2 a week. He will use Tylenol and that will abort his headache. During today's evaluation patient was able to give a very detailed description of his jobs roles and what is he required to do without any hesitation or forgetfulness on the task. He verbalized that he is unable to do the job because he requires multitasking both physically and mentally. He is planning to appeal his disability. Review of Systems   Eyes: Negative for blurred vision and double vision. Light sensitivity   Neurological: Positive for speech change and headaches. Psychiatric/Behavioral: Positive for memory loss. The patient is nervous/anxious. Current Outpatient Medications   Medication Sig    magnesium oxide (MAG-OX) 400 mg tablet Take 1 Tab by mouth daily.     levETIRAcetam (KEPPRA) 500 mg tablet Take 1 Tab by mouth two (2) times a day.  lamoTRIgine (LAMICTAL) 100 mg tablet 1 tab  twice a day    PARoxetine (PAXIL) 10 mg tablet Take 1 Tab by mouth daily.  aspirin (ASPIRIN) 325 mg tablet Take 325 mg by mouth daily. No current facility-administered medications for this visit. Allergies   Allergen Reactions    Poison Ivy Extract Rash     Past Medical History:   Diagnosis Date    Cerebral artery occlusion with cerebral infarction ([facility] [address]      No past surgical history on file. Social History     Socioeconomic History    Marital status:      Spouse name: Not on file    Number of children: Not on file    Years of education: Not on file    Highest education level: Not on file   Occupational History    Not on file   Social Needs    Financial resource strain: Not on file    Food insecurity     Worry: Not on file     Inability: Not on file    Transportation needs     Medical: Not on file     Non-medical: Not on file   Tobacco Use    Smoking status: Former Smoker     Years: 20.00     Last attempt to quit: 8/17/2017     Years since quitting: 3.0    Smokeless tobacco: Never Used   Substance and Sexual Activity    Alcohol use:  Yes     Alcohol/week: 7.0 standard drinks     Types: 7 Glasses of wine per week    Drug use: No    Sexual activity: Yes   Lifestyle    Physical activity     Days per week: Not on file     Minutes per session: Not on file    Stress: Not on file   Relationships    Social connections     Talks on phone: Not on file     Gets together: Not on file     Attends [employer] service: Not on file     Active member of club or organization: Not on file     Attends meetings of clubs or organizations: Not on file     Relationship status: Not on file    Intimate partner violence     Fear of current or ex partner: Not on file     Emotionally abused: Not on file     Physically abused: Not on file     Forced sexual activity: Not on file   Other Topics Concern    Not on file   Social History Narrative    Not on file     Family History   Problem Relation Age of Onset    Cancer Mother     Cancer Father        PHYSICAL EXAMINATION:    Visit Vitals  /80 (BP 1 Location: Left arm, BP Patient Position: Sitting)   Pulse 88   Resp 16   Ht 5' 9\" (1.753 m)   Wt 74.8 kg (165 lb)   SpO2 98%   BMI 24.37 kg/m²       General: Well developed, well nourished. Patient in no apparent distress   Head: Normocephalic, atraumatic, anicteric sclera   Lungs:  Clear to auscultation bilaterally, No wheezes or rubs   Cardiac: Regular rate and rhythm with no murmurs. Abd: Bowel sounds were audible. No tenderness on palpation   Ext: No pedal edema   Skin: No overt signs of rash      Neurological Exam:  Mental Status: Alert and oriented to person place and time   Speech: Fluent no aphasia or dysarthria. Cranial Nerves:   Intact visual fields. Facial sensation is normal. Facial movement is symmetric. Palate is midline. Normal sternocleidomastoid strength. Tongue is midline. Hearing is intact bilaterally. Eyes: PERRL, EOM's full, no nystagmus, no ptosis. Motor:  Full and symmetric strength of upper and lower proximal and distal muscles. Normal bulk and tone. Reflexes:   Deep tendon reflexes 2+/4 and symmetrical.  Plantar response is downgoing b/l. Sensory:   Symmetrically intact  with no perceived deficits modalities involving small or large fibers. Gait:  Gait is balanced and fluid with normal arm swing. Tremor:   No tremor noted. Cerebellar:  Finger to nose and heel over shin to knee was demonstrated competently. Neurovascular: No carotid bruits. ASSESSMENT AND PLAN    ICD-10-CM ICD-9-CM    1. Anxiety and depression  F41.9 300.00 REFERRAL TO NEUROINTERVENTIONAL SURGERY    F32.9 311 REFERRAL TO NEUROPSYCHOLOGY      REFERRAL TO PSYCHOLOGY   2. History of nontraumatic rupture of cerebral aneurysm  Z86.79 V12.59    3.  Memory problem  R41.3 780.93 REFERRAL TO PSYCHOLOGY 29-year-old male who is here with memory loss. He has not completed his evaluation from previously recommend that re visit his neuropsychoncologist to evaluate given like he would like to appeal his work status. I will also recommend that he sees [facility] for second opinion opinion. Will refer  Him again to psychotherapy for some form of counseling. As for his headaches. Will start him on magnesium 400 mg daily to see if that will help with some of his migraines have him follow-up with Dr. Elisha Adame after neuropsych evaluation. 1. Anxiety and depression    - REFERRAL TO NEUROPSYCHOLOGY  - REFERRAL TO PSYCHOLOGY    2. 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 He had a coiling done in [facility]. Now would like a second opinion on whether he should be clipped or recoiled. Do not have records from [address]  3. Memory problem  - REFERRAL TO PSYCHOLOGY     This note will not be viewable in [address]   Kylah Hale NP

## 2020-10-21 ENCOUNTER — TELEPHONE (OUTPATIENT)
Dept: NEUROLOGY | Age: 40
End: 2020-10-21

## 2020-10-21 NOTE — TELEPHONE ENCOUNTER
Rcvd fax from [employer] request raw data. .. spoke with supervisors to confirm. Providers will need to speak to determine why data is needed instead of eval notes. Called and left v/m with a Jinny Bryant ext [phone] for return call to discuss.

## 2020-10-21 NOTE — TELEPHONE ENCOUNTER
[facility] medical records fax request RAW DATA for pt testing. Spoke with dr. Nelida Dominguez and was advised no raw data can be sent but we will send eval for them to review.

## 2022-03-19 PROBLEM — R41.3 MEMORY PROBLEM: Status: ACTIVE | Noted: 2018-08-17

## 2022-03-19 PROBLEM — Z86.79 HISTORY OF NONTRAUMATIC RUPTURE OF CEREBRAL ANEURYSM: Status: ACTIVE | Noted: 2018-08-17

## 2022-08-01 NOTE — TELEPHONE ENCOUNTER
Michel miramontes locate release to Dr. Freda Holt, called [employer] and was able to dpeak with rep EASTON who sent message to sydnee. They are going to fax me the release and once vd I can call them back. with patient

## 2022-11-30 ENCOUNTER — TRANSCRIBE ORDER (OUTPATIENT)
Dept: SCHEDULING | Age: 42
End: 2022-11-30

## 2022-11-30 DIAGNOSIS — I67.1 BRAIN ANEURYSM: Primary | ICD-10-CM

## 2022-12-06 ENCOUNTER — HOSPITAL ENCOUNTER (OUTPATIENT)
Dept: MRI IMAGING | Age: 42
Discharge: HOME | End: 2022-12-06
Payer: MEDICARE

## 2022-12-06 DIAGNOSIS — I67.1 BRAIN ANEURYSM: ICD-10-CM

## 2022-12-06 PROCEDURE — 70544 MR ANGIOGRAPHY HEAD W/O DYE: CPT

## 2022-12-19 NOTE — PROGRESS NOTES
Chief Complaint   Patient presents with    Neurologic Problem         HISTORY OF 1 [facility] came back for follow-up. He has now moved down to [place]. Overall, there are no changes. He says he could not tolerate Keppra because it was giving him double vision when he would wake up in the morning. He stopped taking it. Continues to have episodes of memory lapses and inability to respond to people or process anything for several seconds. It happens at least 2 or 3 times per day. His memory issues are unchanged. He had comprehensive neuropsychological testing done which showed deficits in several cognitive domains and it also suggested underlying anxiety and depression. Continues to take Paxil. He has now stopped aspirin as it was for a short term after aneurysm surgery. 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 This occurred in February and he had coiling done in [facility].  He lives in [facility] [place] [facility] and comes here for work. 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 Since the aneurysm rupture, he has had issues with his memory. Overall, he has improved but still has difficulties remembering things, numbers and doing certain complicated tasks. He has not been able to return back to work. He is scheduled to get cognitive therapy but has not been able to get a sooner appointment in [place] [facility] [place] [facility] and wishes to do all his further workup down here in [place]. Current Outpatient Medications   Medication Sig    lamoTRIgine (LAMICTAL) 25 mg tablet 1 daily for 2 wks, then 2 daily for 2 weeks, then stop and start 100 mg tabs    PARoxetine (PAXIL) 10 mg tablet Take 1 Tab by mouth daily.     levETIRAcetam (KEPPRA) 500 mg tablet Take 1 Tab by mouth two Patient continues to sleep  No distress noted  1:1 at bedside  Will assess patient when he awakes        Matilda Fonseca RN  12/19/22 1874 (2) times a day.  aspirin (ASPIRIN) 325 mg tablet Take 325 mg by mouth daily. No current facility-administered medications for this visit. PHYSICAL EXAMINATION:    Visit Vitals  /90   Pulse 88   Resp 16   Ht 5' 9\" (1.753 m)   Wt 74.8 kg (165 lb)   SpO2 97%   BMI 24.37 kg/m²       NEUROLOGICAL EXAMINATION:     Mental Status:   Alert and oriented to person, place, and time. He scored 23/30 on last [address] cognitive assessment. He had mild difficulty in visuospatial functioning/clock drawing and could not get the numbers right. His language fluency was also impaired and he could only name a few letters that started with a B. Short-term recall was 2/5. He is unable to tell me where he lives, name of the neighborhood, street etc.  Attention span and concentration are normal.       Cranial Nerves:    II, III, IV, VI:  Visual acuity grossly intact. Visual fields are normal.    Pupils are equal, round, and reactive to light and accommodation. Extra-ocular movements are full and fluid. V-XII: Hearing is grossly intact. Facial features are symmetric, with normal sensation and strength. The palate rises symmetrically and the tongue protrudes midline. Motor Examination: Normal tone, bulk, and strength. 5/5 muscle strength throughout. No cogwheel rigidity or clonus present. Sensory exam:  Normal throughout to pinprick, temperature, and vibration sense. Normal proprioception. Coordination:  No resting or intention tremor    Gait and Station:  Steady. Normal arm swing. No Rhomberg or pronator drift. No muscle wasting or fasiculations noted. Reflexes:  DTRs 2+ throughout. Toes downgoing. LABS / IMAGING  CT Results (most recent):  Results from Hospital Encounter encounter on 08/28/18   CT HEAD WO CONT    Narrative EXAM:  CT HEAD WO CONT    INDICATION:   ICH follow up    COMPARISON: None. TECHNIQUE: Unenhanced CT of the head was performed using 5 mm images. Brain and  bone windows were generated. CT dose reduction was achieved through use of a  standardized protocol tailored for this examination and automatic exposure  control for dose modulation. FINDINGS:  Postprocedural changes of anterior communicating artery aneurysm coiling, with  streak artifact limiting evaluation this region. Small chronic infarct in the  right frontal deep white matter. The ventricles are normal in size and position. Basilar cisterns are patent. No midline shift. There is no evidence of acute  infarct, hemorrhage, or extraaxial fluid collection. The paranasal sinuses, mastoid air cells, and middle ears are clear. The orbital  contents are within normal limits. There are no significant osseous or  extracranial soft tissue lesions. Impression IMPRESSION:  1. No evidence of acute intracranial abnormality. Small chronic infarct in the  right frontal deep white matter. 2. Postprocedural changes of anterior communicating artery aneurysm coil  embolization. EEG was normal    B12 and TFTs were unremarkable    ASSESSMENT    ICD-10-CM ICD-9-CM    1. History of nontraumatic rupture of cerebral aneurysm Z86.79 V12.59    2. Cognitive impairment R41.89 294.9    3. Transient alteration of awareness R40.4 780.02 lamoTRIgine (LAMICTAL) 25 mg tablet      NEURO EEG 24 HR    4. Anxiety F41.9 300.00 lamoTRIgine (LAMICTAL) 25 mg tablet       DISCUSSION  Mr. Maira Kirk does have residual cognitive difficulties in the form of difficulties with visuospatial function, short-term recall and language fluency. He specifically has difficulty remembering numbers. This is likelyrelated to brain injury related to aneurysm rupture. He has had transient lapses in awareness which could be focal onset/frontal lobe seizures. He was on Keppra but has stopped taking it because it was causing double vision.   Since the episodes occur at least 2-3 times per day, will proceed with ambulatory 24-hour EEG to try to capture these and see if these are actually focal seizures. Frontal lobe seizures can often be difficult to  on EEGs. We may need to consider inpatient video EEG monitoring if this is inconclusive  We will try him on alternative anticonvulsant i.e. lamotrigine. He will start 25 mg daily for 2 weeks and then 50 mg daily for 2 weeks. Thereafter he will take 100 mg daily on week 5 and then 100 and twice daily to continue  Side effect profile of this medication was reviewed including rash  Continue paroxetine    Follow-up in 3 months    Terry Gonsalez MD  Diplomate, American Board of Psychiatry & Neurology (Neurology)  Kartik Aguayo Board of Psychiatry & Neurology (Clinical Neurophysiology)  Diplomate, American Board of Electrodiagnostic Medicine  This note will not be viewable in [address]

## 2023-05-23 RX ORDER — MAGNESIUM OXIDE 400 MG/1
400 TABLET ORAL DAILY
COMMUNITY
Start: 2020-09-02

## 2023-05-23 RX ORDER — LAMOTRIGINE 100 MG/1
TABLET ORAL
COMMUNITY
Start: 2020-03-02

## 2023-05-23 RX ORDER — LEVETIRACETAM 500 MG/1
500 TABLET ORAL 2 TIMES DAILY
COMMUNITY
Start: 2020-03-02

## 2023-05-23 RX ORDER — PAROXETINE 10 MG/1
10 TABLET, FILM COATED ORAL DAILY
COMMUNITY
Start: 2019-01-23

## 2023-05-23 RX ORDER — ASPIRIN 325 MG
325 TABLET ORAL DAILY
COMMUNITY